# Patient Record
Sex: FEMALE | Race: WHITE | NOT HISPANIC OR LATINO
[De-identification: names, ages, dates, MRNs, and addresses within clinical notes are randomized per-mention and may not be internally consistent; named-entity substitution may affect disease eponyms.]

---

## 2017-01-11 ENCOUNTER — APPOINTMENT (OUTPATIENT)
Dept: ORTHOPEDIC SURGERY | Facility: CLINIC | Age: 52
End: 2017-01-11

## 2017-01-11 VITALS
DIASTOLIC BLOOD PRESSURE: 82 MMHG | HEART RATE: 73 BPM | WEIGHT: 272 LBS | BODY MASS INDEX: 42.69 KG/M2 | SYSTOLIC BLOOD PRESSURE: 126 MMHG | HEIGHT: 67 IN

## 2017-01-11 DIAGNOSIS — Z86.39 PERSONAL HISTORY OF OTHER ENDOCRINE, NUTRITIONAL AND METABOLIC DISEASE: ICD-10-CM

## 2017-01-11 DIAGNOSIS — M70.62 TROCHANTERIC BURSITIS, LEFT HIP: ICD-10-CM

## 2017-01-11 DIAGNOSIS — M54.5 LOW BACK PAIN: ICD-10-CM

## 2017-01-11 PROBLEM — Z00.00 ENCOUNTER FOR PREVENTIVE HEALTH EXAMINATION: Status: ACTIVE | Noted: 2017-01-11

## 2017-01-11 RX ORDER — OMEPRAZOLE 40 MG/1
CAPSULE, DELAYED RELEASE ORAL
Refills: 0 | Status: ACTIVE | COMMUNITY

## 2017-01-12 ENCOUNTER — TRANSCRIPTION ENCOUNTER (OUTPATIENT)
Age: 52
End: 2017-01-12

## 2019-08-21 ENCOUNTER — APPOINTMENT (OUTPATIENT)
Dept: ORTHOPEDIC SURGERY | Facility: CLINIC | Age: 54
End: 2019-08-21
Payer: COMMERCIAL

## 2019-08-21 ENCOUNTER — TRANSCRIPTION ENCOUNTER (OUTPATIENT)
Age: 54
End: 2019-08-21

## 2019-08-21 VITALS — HEART RATE: 67 BPM | DIASTOLIC BLOOD PRESSURE: 83 MMHG | SYSTOLIC BLOOD PRESSURE: 129 MMHG

## 2019-08-21 PROCEDURE — 72170 X-RAY EXAM OF PELVIS: CPT | Mod: 59

## 2019-08-21 PROCEDURE — 72110 X-RAY EXAM L-2 SPINE 4/>VWS: CPT

## 2019-08-21 PROCEDURE — 99213 OFFICE O/P EST LOW 20 MIN: CPT

## 2019-08-21 NOTE — PHYSICAL EXAM
[Obese] : obese [Limited] : is limited [Normal] : normal [L5-LT] : L5 [DP] : dorsalis pedis 2+ and symmetric bilaterally [PT] : posterior tibial 2+ and symmetric bilaterally [] : Motor: [NL] : Motor strength of the left lower extremity was normal [___/5] : right ([unfilled]/5) [Motor Strength Lower Extremities] : right (5/5) [2+] : left patella 2+ [1+] : left ankle jerk 1+ [Poor Appearance] : well-appearing [Acute Distress] : not in acute distress [Abl Mood] : in a normal mood [Abl Affect] : with normal affect [Poor Coordination] : normal coordination [Disorientation] : oriented x 3 [Painful] : not painful [SLR] : negative straight leg raise [Plantar Reflex Right Only] : absent on the right [Plantar Reflex Left Only] : absent on the left [DTR Reflexes Clonus Of Left Ankle (___ Beats)] : absent on the left [DTR Reflexes Clonus Of Right Ankle (___ Beats)] : absent on the right [de-identified] : She can forward flex to her knees and extend 30° with no back pain [FreeTextEntry2] : The pt is awake, alert and oriented to self, place and time, is comfortable and in no acute distress. Gait examination reveals a narrow based, non-ataxic, non-antalgic gait. Can heel and toe walk without difficulty. Inspection of neck, back and lower extremities bilaterally reveals no rashes or ecchymotic lesions.  There is no obvious abnormal spinal curvature in the sagittal and coronal planes. There is no tenderness over the cervical, thoracic or lumbar spine, or the paraspinal or upper and lower extremities musculature. There is no sacroiliac tenderness. No atrophy or abnormal movements noted in the upper or lower extremities. There is no swelling noted in the upper or lower extremities bilaterally. No cervical lymphadenopathy noted anteriorly. No joint laxity noted in the upper and lower extremity joints bilaterally.\par  Hip range of motion is degrees internal rotation 30° external rotation without pain. Full range of motion of the shoulders bilaterally with no significant pain\par Negative straight leg raise to 45° in the sitting position bilaterally. There is no groin pain with hip internal rotation and a negative HUNTER test bilaterally.  [de-identified] : Right GT tenderness [de-identified] : AP pelvis demonstrates normal appearance with no significant degenerative changes are noted. No acute fractures identified\par \par 4 views lumbar spine demonstrate her vascular clips and the right upper quadrant. Minimal left-sided lumbar curve is noted there on the lateral projection lordotic angulation is seen at the L4-5 level. Between flexion-extension no dynamic instability is noted. There is loss of disc height posteriorly with degenerative changes seen at the L4-5 level. No acute fractures identified.

## 2019-08-21 NOTE — DISCUSSION/SUMMARY
[Medication Risks Reviewed] : Medication risks reviewed [de-identified] : Recommended MRI of the lumbar spine for further evaluation. Prescribed her Voltaren gel and diclofenac.Further treatment options can be discussed after the MRI has beenperformed.\par \par The patient was educated regarding their condition, treatment options as well as prescribed course of treatment. \par Risks and benefits as well as alternatives to the proposed treatment were also provided to the patient \par They were given the opportunity to have all their questions answered to their satisfaction.\par \par Vital signs were reviewed with the patient and the patient was instructed to followup with their primary care provider for further management.\par \par Healthy lifestyle recommendations were also made including a tobacco free lifestyle, proper diet, and weight control.

## 2019-08-21 NOTE — HISTORY OF PRESENT ILLNESS
[6] : a maximum pain level of 6/10 [Constant] : ~He/She~ states the symptoms seem to be constant [Improving] : improving [___ days] : [unfilled] day(s) ago [de-identified] : Patient presents here today with lower back pain radiating from her lower back into the right hip. There is recently no known injuries. Patient reports when sitting the pain is not at it's worse, however when lying down that is when the pain occurs. \par Since last visit 1/2017 she has had occasional discomfort. [de-identified] : laying down [de-identified] : sitting [de-identified] : Advil, Aleve  which gives no relief

## 2019-09-07 ENCOUNTER — OUTPATIENT (OUTPATIENT)
Dept: OUTPATIENT SERVICES | Facility: HOSPITAL | Age: 54
LOS: 1 days | End: 2019-09-07
Payer: COMMERCIAL

## 2019-09-07 ENCOUNTER — APPOINTMENT (OUTPATIENT)
Dept: MRI IMAGING | Facility: IMAGING CENTER | Age: 54
End: 2019-09-07
Payer: COMMERCIAL

## 2019-09-07 DIAGNOSIS — M51.37 OTHER INTERVERTEBRAL DISC DEGENERATION, LUMBOSACRAL REGION: ICD-10-CM

## 2019-09-07 DIAGNOSIS — M54.16 RADICULOPATHY, LUMBAR REGION: ICD-10-CM

## 2019-09-07 PROCEDURE — 72148 MRI LUMBAR SPINE W/O DYE: CPT

## 2019-09-07 PROCEDURE — 72148 MRI LUMBAR SPINE W/O DYE: CPT | Mod: 26

## 2019-09-25 ENCOUNTER — APPOINTMENT (OUTPATIENT)
Dept: ORTHOPEDIC SURGERY | Facility: CLINIC | Age: 54
End: 2019-09-25
Payer: COMMERCIAL

## 2019-09-25 VITALS
HEART RATE: 70 BPM | BODY MASS INDEX: 42.53 KG/M2 | SYSTOLIC BLOOD PRESSURE: 153 MMHG | WEIGHT: 271 LBS | DIASTOLIC BLOOD PRESSURE: 80 MMHG | HEIGHT: 67 IN

## 2019-09-25 DIAGNOSIS — M54.16 RADICULOPATHY, LUMBAR REGION: ICD-10-CM

## 2019-09-25 PROCEDURE — 99214 OFFICE O/P EST MOD 30 MIN: CPT

## 2019-09-25 NOTE — HISTORY OF PRESENT ILLNESS
[Improving] : improving [None] : No exacerbating factors are noted [0] : a current pain level of 0/10 [Rest] : relieved by rest [de-identified] : Patient is here today to review mri lumbar spine 9/7/19. Patient states since her last office visit feeling less pain. Patient stopped gabapentin and diclofenac.\par Right leg pain has resolved.\par Has chronic axial low back pain.\par Gastric bypass surgery ongoing discussion

## 2019-09-25 NOTE — PHYSICAL EXAM
[Obese] : obese [Normal] : normal [Limited] : is limited [NL] : Motor strength of the left lower extremity was normal [___/5] : right ([unfilled]/5) [Motor Strength Lower Extremities] : right (5/5) [] : Sensory: [L5-LT] : L5 [2+] : left patella 2+ [1+] : left ankle jerk 1+ [DP] : dorsalis pedis 2+ and symmetric bilaterally [PT] : posterior tibial 2+ and symmetric bilaterally [Poor Appearance] : well-appearing [Acute Distress] : not in acute distress [Abl Mood] : in a normal mood [Abl Affect] : with normal affect [Poor Coordination] : normal coordination [Disorientation] : oriented x 3 [Painful] : not painful [SLR] : negative straight leg raise [Plantar Reflex Right Only] : absent on the right [Plantar Reflex Left Only] : absent on the left [DTR Reflexes Clonus Of Right Ankle (___ Beats)] : absent on the right [DTR Reflexes Clonus Of Left Ankle (___ Beats)] : absent on the left [FreeTextEntry2] : The pt is awake, alert and oriented to self, place and time, is comfortable and in no acute distress. Gait examination reveals a narrow based, non-ataxic, non-antalgic gait. Can heel and toe walk without difficulty. Inspection of neck, back and lower extremities bilaterally reveals no rashes or ecchymotic lesions.  There is no obvious abnormal spinal curvature in the sagittal and coronal planes. There is no tenderness over the cervical, thoracic or lumbar spine, or the paraspinal or upper and lower extremities musculature. There is no sacroiliac tenderness. No atrophy or abnormal movements noted in the upper or lower extremities. There is no swelling noted in the upper or lower extremities bilaterally. No cervical lymphadenopathy noted anteriorly. No joint laxity noted in the upper and lower extremity joints bilaterally.\par  Hip range of motion is degrees internal rotation 30° external rotation without pain. Full range of motion of the shoulders bilaterally with no significant pain\par Negative straight leg raise to 45° in the sitting position bilaterally. There is no groin pain with hip internal rotation and a negative HUNTER test bilaterally.  [de-identified] : She can forward flex to her knees and extend 30° with no back pain [de-identified] : Right GT tenderness [de-identified] : EXAM: MR SPINE LUMBAR \par \par PROCEDURE DATE: 09/07/2019 \par \par INTERPRETATION: CLINICAL INFORMATION: Lower back pain radiating into right \par hip and leg. Sciatica. \par \par TECHNIQUE: Multiplanar, multisequence MR imaging was obtained of the \par lumbosacral spine. \par \par Comparison: Lumbar spine radiographs dated 8/21/2019 from [facility]. \par \par FINDINGS: \par \par The study assumes 5 lumbar type nonrib-bearing vertebrae. \par \par DISTAL CORD AND CONUS: Conus terminates at the level of L2. Normal \par morphology of conus terminalis and cauda equina. \par BONES: The L4 vertebrae demonstrates mild decreased T1 signal throughout the \par vertebral body without marrow edema. The vertebra is mildly enlarged in the \par AP dimension relative to the adjacent vertebra. There is a small Schmorl's \par node at the superior endplate of L4. No focal lesion is identified. \par SPINAL ALIGNMENT: Minimal levocurvature with apex at L3. \par \par DISC LEVEL EVALUATION: Disc desiccation is noted throughout the \par intervertebral discs. Disc heights are maintained. \par \par T11/T12: Evaluated only in the sagittal plane. No central canal or foraminal \par narrowing. \par T12/L1: No central canal or neuroforaminal narrowing. \par L1/L2: No central canal or neuroforaminal narrowing. Lateral recesses are \par preserved. \par L2/L3: No central canal or neuroforaminal narrowing. Lateral recesses are \par preserved. \par L3/L4: Mild bilateral facet productive change. Small facet effusions. Mild \par disc bulging prominent in the foramina. No central canal or lateral recess \par narrowing. Mild bilateral foraminal narrowing. \par L4/L5: There is disc bulging and posterior osseous ridging with a \par superimposed left foraminal disc protrusion contributing to mild to moderate \par left foraminal narrowing. There is mild right foraminal narrowing. Mild left \par lateral recess narrowing. No central canal stenosis. \par L5/S1: Mild disc bulge with flattening of the ventral thecal sac. No \par neuroforaminal narrowing. Moderate bilateral facet arthrosis. \par \par SI JOINTS: The visualized portions are unremarkable. \par PARASPINAL MUSCLE AND SOFT TISSUES: Within normal limits. \par INTRAABDOMINAL/INTRAPELVIC SOFT TISSUES: Within normal limits. \par \par IMPRESSION: \par \par 1. At L4/L5, mild left foraminal disc herniation with mild neuroforaminal \par narrowing . \par 2. Additional multilevel spondylosis as above. \par 3. Mildly enlarged L4 vertebral body with hypointense T1 marrow signal \par throughout without focal destructive lesion. Findings may reflect early \par Paget's disease and three month interval follow-up is recommended to ensure \par a lack of change. \par \par KELLEN ROCHA M.D., RADIOLOGY RESIDENT \par This document has been electronically signed. \par DIANDRA GILLIAM M.D., ATTENDING RADIOLOGIST \par This document has been electronically signed. Sep 9 2019 1:07PM

## 2019-09-25 NOTE — REASON FOR VISIT
[Follow-Up Visit] : a follow-up visit for [Degenerative Joint Disease] : degenerative joint disease [Back Pain] : back pain [Radiculopathy] : radiculopathy

## 2020-02-11 ENCOUNTER — RESULT REVIEW (OUTPATIENT)
Age: 55
End: 2020-02-11

## 2020-03-12 ENCOUNTER — EMERGENCY (EMERGENCY)
Facility: HOSPITAL | Age: 55
LOS: 1 days | Discharge: HOME | End: 2020-03-12
Admitting: EMERGENCY MEDICINE
Payer: COMMERCIAL

## 2020-03-12 VITALS
TEMPERATURE: 98 F | DIASTOLIC BLOOD PRESSURE: 81 MMHG | SYSTOLIC BLOOD PRESSURE: 138 MMHG | OXYGEN SATURATION: 100 % | RESPIRATION RATE: 18 BRPM | HEART RATE: 68 BPM

## 2020-03-12 DIAGNOSIS — Z98.84 BARIATRIC SURGERY STATUS: Chronic | ICD-10-CM

## 2020-03-12 LAB

## 2020-03-12 PROCEDURE — 71046 X-RAY EXAM CHEST 2 VIEWS: CPT | Mod: 26

## 2020-03-12 PROCEDURE — 99283 EMERGENCY DEPT VISIT LOW MDM: CPT

## 2020-03-12 NOTE — ED PROVIDER NOTE - NSFOLLOWUPINSTRUCTIONS_ED_ALL_ED_FT
Follow up with your primary care provider within 1 week  Drink plenty of fluids  Take Tylenol 650mg every 6 hours as needed for fever or body aches  Take Tessalon Perle 100mg (1 tablet) every 8 hours as needed for cough  Return to the ER with any worsening or concerning symptoms, shortness of breath, worsening cough, high fever or any other concerns.

## 2020-03-12 NOTE — ED PROVIDER NOTE - PATIENT PORTAL LINK FT
You can access the FollowMyHealth Patient Portal offered by [facility] by registering at the following website: [email or web address]. By joining [employer]’s FollowMyHealth portal, you will also be able to view your health information using other applications (apps) compatible with our system.

## 2020-03-12 NOTE — ED PROVIDER NOTE - OBJECTIVE STATEMENT
54yF w/pmhx gastric bypass 4 months ago presenting with cough, fever and body aches x 2 days. Pt states 2 nights ago she developed lightheadedness with cough. The following day she felt body aches, weakness, subjective fever. She went to  yesterday and negative for the flu, she was told she had a viral illness and sent home. Pt denies dizziness, chest pain, shortness of breath, abdominal pain, nausea, vomiting, diarrhea, sick contacts, recent travel, known contact with COVID or any other concerns.

## 2020-03-12 NOTE — ED ADULT TRIAGE NOTE - CHIEF COMPLAINT QUOTE
states" I am having fever and body aches with cough since yesterday " denies any past medical history

## 2020-03-12 NOTE — ED PROVIDER NOTE - PROGRESS NOTE DETAILS
PETR Gillis: CXR clear, admin PA to follow RVP. Discussed with pt she should isolate herself as much as possible and wear mask. Recommend fluids and Tylenol PRN. Will rx tessalon perles for cough

## 2020-03-12 NOTE — ED PROVIDER NOTE - CLINICAL SUMMARY MEDICAL DECISION MAKING FREE TEXT BOX
54yF w/pmhx gastric bypass 4 months ago presenting with cough, fever and body aches x 2 days. Pt is well appearing. She was flu negative yesterday at , told by her job to come to ER to be evaluated. Pt is low risk for COVID, no recent travel, no sick contacts, no contacts with confirmed COVID. Will get CXR to r/o pneumonia and RVP. Pt does not meet criteria for testing COVID at this time. Pt refusing pain medication for body aches.

## 2020-10-07 PROBLEM — Z78.9 OTHER SPECIFIED HEALTH STATUS: Chronic | Status: ACTIVE | Noted: 2020-03-12

## 2020-10-14 ENCOUNTER — APPOINTMENT (OUTPATIENT)
Dept: ORTHOPEDIC SURGERY | Facility: CLINIC | Age: 55
End: 2020-10-14
Payer: COMMERCIAL

## 2020-10-14 VITALS
DIASTOLIC BLOOD PRESSURE: 80 MMHG | HEIGHT: 67 IN | HEART RATE: 58 BPM | TEMPERATURE: 97.3 F | SYSTOLIC BLOOD PRESSURE: 120 MMHG

## 2020-10-14 DIAGNOSIS — M75.81 OTHER SHOULDER LESIONS, RIGHT SHOULDER: ICD-10-CM

## 2020-10-14 PROCEDURE — 99214 OFFICE O/P EST MOD 30 MIN: CPT | Mod: 25

## 2020-10-14 PROCEDURE — 73030 X-RAY EXAM OF SHOULDER: CPT | Mod: RT

## 2020-10-14 PROCEDURE — 20610 DRAIN/INJ JOINT/BURSA W/O US: CPT | Mod: RT

## 2020-10-14 RX ADMIN — Medication %: at 00:00

## 2020-10-14 RX ADMIN — METHYLPREDNISOLONE ACETATE MG/ML: 40 INJECTION, SUSPENSION INTRA-ARTICULAR; INTRALESIONAL; INTRAMUSCULAR; SOFT TISSUE at 00:00

## 2020-10-14 RX ADMIN — BUPIVACAINE HYDROCHLORIDE %: 2.5 INJECTION, SOLUTION INFILTRATION; PERINEURAL at 00:00

## 2020-10-14 NOTE — DISCUSSION/SUMMARY
[Medication Risks Reviewed] : Medication risks reviewed [de-identified] : The patient has symptoms consistent with rotator cuff tendinitis right shoulder with impingement syndrome.  Offered her injection of corticosteroids for this and she wanted to proceed.  Under sterile conditions 40 mg Depo-Medrol mixed with a 5 cc solution of 1% lidocaine 1.25% Marcaine without epinephrine was injected into the right shoulder subacromial space.  The patient tolerated the procedure well and had more than 80% relief of pain symptoms 5 minutes after the injection.  Prescribed her physical therapy.  Recommended follow-up in 4 to 6 weeks on as-needed basis.  If her symptoms persist or worsen additional imaging studies may be considered at that time including MRI of the right shoulder.\par \par Patient was noted to have an L4 lesion an MRI obtained last year.  Recommended at this time an updated MRI which had been previously recommended a 3-month follow-up with the patient chose not to pursue it at that time.  We will do a follow-up MRI as previously indicated for the lumbar spine.\par \par The patient was educated regarding their condition, treatment options as well as prescribed course of treatment. \par Risks and benefits as well as alternatives to the proposed treatment were also provided to the patient \par They were given the opportunity to have all their questions answered to their satisfaction.\par \par Vital signs were reviewed with the patient and the patient was instructed to followup with their primary care provider for further management.\par \par Healthy lifestyle recommendations were also made including a tobacco free lifestyle, proper diet, and weight control.

## 2020-10-14 NOTE — PHYSICAL EXAM
[Normal] : Gait: normal [UE] : Sensory: Intact in bilateral upper extremities [Bicep] : biceps 2+ and symmetric bilaterally [B.R.] : biceps 2+ and symmetric bilaterally [Tricep] : triceps 2+ and symmetric bilaterally [Rad] : radial 2+ and symmetric bilaterally [de-identified] : The pt is awake, alert and oriented to self, place and time, is comfortable and in no acute distress. Gait evaluation reveals a narrow based, non-ataxic, non-antalgic gait. Negative Romberg sign noted. Can heel and toe walk without difficulty. Inspection of the neck, back and upper extremities bilaterally reveals no rashes or ecchymotic lesions. There is no obvious abnormal spinal curvature in the sagittal and coronal planes. No crepitus or instability noted with range of motion of bilateral upper extremities. No joint laxity noted in the upper and lower extremities bilaterally. No atrophy or abnormal movements noted in the upper or lower extremities. No tenderness over the cervical, thoracic, lumbar spine or in the paraspinal, or upper and lower extremity musculature. There is no swelling noted in the upper or lower extremities bilaterally. No cervical lymphadenopathy noted anteriorly.\par Negative Spurling's sign bilaterally. In the lumbar spine the patient can forward flex to her mid tibia and extend 30 degrees without pain\par Negative Babinski sign and no clonus bilaterally in the upper or lower extremities. [de-identified] : AP and lateral image of the right shoulder demonstrates minimal AC arthrosis with small anterior osteophyte suspected.  No significant degeneration of the glenohumeral joint obvious luxation appreciated. [de-identified] : full ROM cervical spine flex chin to chest, ext 30 degrees, left and right lat rot 30 degrees no pain\par + Neers sign right shoulder,  degrees, ER 40 degrees IR to L2 compared to 130, 50 and L1 on the left\par Tenderness over the greater tuberosity.

## 2020-10-14 NOTE — HISTORY OF PRESENT ILLNESS
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

## 2020-10-26 RX ORDER — BUPIVACAINE HCL/PF 2.5 MG/ML
0.25 VIAL (ML) INJECTION
Refills: 0 | Status: COMPLETED | OUTPATIENT
Start: 2020-10-14

## 2020-10-26 RX ORDER — METHYLPRED ACET/NACL,ISO-OS/PF 40 MG/ML
40 VIAL (ML) INJECTION
Qty: 1 | Refills: 0 | Status: COMPLETED | OUTPATIENT
Start: 2020-10-14

## 2020-10-26 RX ORDER — LIDOCAINE HYDROCHLORIDE 10 MG/ML
1 INJECTION, SOLUTION INFILTRATION; PERINEURAL
Refills: 0 | Status: COMPLETED | OUTPATIENT
Start: 2020-10-14

## 2020-11-03 ENCOUNTER — APPOINTMENT (OUTPATIENT)
Dept: MRI IMAGING | Facility: IMAGING CENTER | Age: 55
End: 2020-11-03
Payer: COMMERCIAL

## 2020-11-03 ENCOUNTER — OUTPATIENT (OUTPATIENT)
Dept: OUTPATIENT SERVICES | Facility: HOSPITAL | Age: 55
LOS: 1 days | End: 2020-11-03
Payer: COMMERCIAL

## 2020-11-03 DIAGNOSIS — M54.5 LOW BACK PAIN: ICD-10-CM

## 2020-11-03 DIAGNOSIS — Z98.84 BARIATRIC SURGERY STATUS: Chronic | ICD-10-CM

## 2020-11-03 DIAGNOSIS — Z00.8 ENCOUNTER FOR OTHER GENERAL EXAMINATION: ICD-10-CM

## 2020-11-03 PROCEDURE — 72148 MRI LUMBAR SPINE W/O DYE: CPT

## 2020-11-03 PROCEDURE — 72148 MRI LUMBAR SPINE W/O DYE: CPT | Mod: 26

## 2020-11-16 ENCOUNTER — APPOINTMENT (OUTPATIENT)
Dept: ORTHOPEDIC SURGERY | Facility: CLINIC | Age: 55
End: 2020-11-16
Payer: COMMERCIAL

## 2020-11-16 VITALS
TEMPERATURE: 97.4 F | DIASTOLIC BLOOD PRESSURE: 79 MMHG | HEART RATE: 57 BPM | SYSTOLIC BLOOD PRESSURE: 122 MMHG | HEIGHT: 67 IN

## 2020-11-16 DIAGNOSIS — M51.46 SCHMORL'S NODES, LUMBAR REGION: ICD-10-CM

## 2020-11-16 PROCEDURE — 99072 ADDL SUPL MATRL&STAF TM PHE: CPT

## 2020-11-16 PROCEDURE — 99214 OFFICE O/P EST MOD 30 MIN: CPT

## 2020-11-16 RX ORDER — LEVOTHYROXINE SODIUM 0.15 MG/1
150 TABLET ORAL
Qty: 90 | Refills: 0 | Status: ACTIVE | COMMUNITY
Start: 2020-09-22

## 2020-11-16 NOTE — HISTORY OF PRESENT ILLNESS
[Improving] : improving [1] : a current pain level of 1/10 [Daily] : ~He/She~ states the symptoms seem to be occuring daily [Bending] : worsened by bending [Lifting] : worsened by lifting [Physical Therapy] : relieved by physical therapy [Rest] : relieved by rest [de-identified] : Patient is here today to review mri lumbar spine 11/3/2020.  Patient is going for physical therapy for her shoulder. \par Riught shoulder pain is significantly improved with injection and PT [de-identified] : sitting

## 2020-11-16 NOTE — REASON FOR VISIT
[Follow-Up Visit] : a follow-up visit for [Back Pain] : back pain [FreeTextEntry2] : right rotator cuff tendinitis

## 2020-11-16 NOTE — PHYSICAL EXAM
[Normal] : Gait: normal [UE] : Sensory: Intact in bilateral upper extremities [Bicep] : biceps 2+ and symmetric bilaterally [B.R.] : biceps 2+ and symmetric bilaterally [Tricep] : triceps 2+ and symmetric bilaterally [Rad] : radial 2+ and symmetric bilaterally [de-identified] : EXAM: MR SPINE LUMBAR\par \par PROCEDURE DATE: 11/03/2020\par \par INTERPRETATION: LUMBOSACRAL SPINE MRI\par \par CLINICAL INFORMATION: Low back pain. Lesion at L4.\par TECHNIQUE: Multiplanar, multisequence MR imaging was obtained of the lumbosacral spine.\par FINDINGS:\par \par DISC LEVEL EVALUATION:\par \par L1/L2: Normal\par L2/L3: There is mild disc bulging without central canal or foraminal narrowing.\par L3/L4: There is mild disc bulging present with mild bilateral foraminal narrowing.\par L4/L5: Mild facet arthrosis is present. There is a small broad-based posterior disc protrusion with small posterior osteophyte formation causing moderate bilateral foraminal narrowing. Disc mildly indents the ventral thecal sac without significant stenosis. At the inferior endplate of L4, there is Schmorl's node formation with surrounding bone marrow edema. In addition, there is a region of surrounding low T1 signal and bright signal on T2-weighted imaging with a somewhat columnar pattern suggesting the possibility of an underlying hemangioma.\par L5/S1: There is a very small posterior disc protrusion with mild to moderate bilateral foraminal narrowing.\par \par SPINAL ALIGNMENT: Trace anterolisthesis at L4-L5.\par DISTAL CORD AND CONUS: The distal cord is normal in appearance. The conus terminates at L1 and is unremarkable.\par SI JOINTS: There is minimal degenerative change of the right SI joint inferiorly\par MARROW: There is a hemangioma within the T12 vertebral body. As described above there is Schmorl's node formation with surrounding bone marrow edema and a suspected adjacent hemangioma within the inferior aspect of the L4 vertebral body.\par PARASPINAL MUSCLE AND SOFT TISSUES: Normal\par INTRAABDOMINAL/INTRAPELVIC SOFT TISSUES: Normal\par \par IMPRESSION: Mild multilevel lumbar spondylosis that is most advanced at L4-L5 as detailed above.\par \par Within the L4 vertebral body, there is Schmorl's node formation at the inferior endplate with associated bone marrow edema and with a suspected adjacent hemangioma.\par \par YOSI FUENTES MD; Attending Radiologist\par This document has been electronically signed. Nov 4 2020 2:58PM [de-identified] : The pt is awake, alert and oriented to self, place and time, is comfortable and in no acute distress. Gait evaluation reveals a narrow based, non-ataxic, non-antalgic gait. Negative Romberg sign noted. Can heel and toe walk without difficulty. Inspection of the neck, back and upper extremities bilaterally reveals no rashes or ecchymotic lesions. There is no obvious abnormal spinal curvature in the sagittal and coronal planes. No crepitus or instability noted with range of motion of bilateral upper extremities. No joint laxity noted in the upper and lower extremities bilaterally. No atrophy or abnormal movements noted in the upper or lower extremities. No tenderness over the cervical, thoracic, lumbar spine or in the paraspinal, or upper and lower extremity musculature. There is no swelling noted in the upper or lower extremities bilaterally. No cervical lymphadenopathy noted anteriorly.\par Negative Spurling's sign bilaterally. In the lumbar spine the patient can forward flex to her mid tibia and extend 30 degrees without pain\par Negative Babinski sign and no clonus bilaterally in the upper or lower extremities. [de-identified] : full ROM cervical spine flex chin to chest, ext 30 degrees, left and right lat rot 30 degrees no pain\par + Neers sign right shoulder,  degrees, ER 40 degrees IR to L2 compared to 130, 50 and L1 on the left\par Tenderness over the greater tuberosity.

## 2020-11-16 NOTE — DISCUSSION/SUMMARY
[Medication Risks Reviewed] : Medication risks reviewed [de-identified] : At this time the patient has reported significant improvement of right shoulder pain following her shoulder injection as well as physical therapy.  She is not interested in any surgical interventions for her shoulder.  She will continue exercises and physical therapy.  She has been approved for 4 visits but may benefit from additional therapy exercise for her shoulder.\par \par The patient has been doing dead lifts and significant weight training and I recommended she modify that given the MRI findings of a Schmorl's node and reactive changes of the L4 vertebral body.  The previously noted changes from MRI in October 2019 are essentially unchanged suggesting no obvious significant pathology at that level besides the Schmorl's node in the reactive endplate and vertebral body changes noted.  We discussed the option of cement augmentation for this but that would be considered off label intervention and she understands it.  The patient is also not interested in any surgical interventions and I highly recommended activity modification program for her.\par \par I will continue see her back on an as-needed basis for her symptoms.\par \par The patient was educated regarding their condition, treatment options as well as prescribed course of treatment. \par Risks and benefits as well as alternatives to the proposed treatment were also provided to the patient \par They were given the opportunity to have all their questions answered to their satisfaction.\par \par Vital signs were reviewed with the patient and the patient was instructed to followup with their primary care provider for further management.\par \par Healthy lifestyle recommendations were also made including a tobacco free lifestyle, proper diet, and weight control.

## 2021-02-15 ENCOUNTER — RESULT REVIEW (OUTPATIENT)
Age: 56
End: 2021-02-15

## 2022-01-31 ENCOUNTER — OUTPATIENT (OUTPATIENT)
Dept: OUTPATIENT SERVICES | Facility: HOSPITAL | Age: 57
LOS: 1 days | End: 2022-01-31
Payer: COMMERCIAL

## 2022-01-31 ENCOUNTER — APPOINTMENT (OUTPATIENT)
Dept: MRI IMAGING | Facility: IMAGING CENTER | Age: 57
End: 2022-01-31
Payer: COMMERCIAL

## 2022-01-31 DIAGNOSIS — Z00.8 ENCOUNTER FOR OTHER GENERAL EXAMINATION: ICD-10-CM

## 2022-01-31 DIAGNOSIS — Z98.84 BARIATRIC SURGERY STATUS: Chronic | ICD-10-CM

## 2022-01-31 PROCEDURE — 70553 MRI BRAIN STEM W/O & W/DYE: CPT | Mod: 26

## 2022-01-31 PROCEDURE — 70544 MR ANGIOGRAPHY HEAD W/O DYE: CPT | Mod: 26,59

## 2022-01-31 PROCEDURE — 70544 MR ANGIOGRAPHY HEAD W/O DYE: CPT

## 2022-01-31 PROCEDURE — 70553 MRI BRAIN STEM W/O & W/DYE: CPT

## 2022-01-31 PROCEDURE — A9585: CPT

## 2022-02-21 ENCOUNTER — RESULT REVIEW (OUTPATIENT)
Age: 57
End: 2022-02-21

## 2022-05-24 ENCOUNTER — NON-APPOINTMENT (OUTPATIENT)
Age: 57
End: 2022-05-24

## 2022-06-06 ENCOUNTER — APPOINTMENT (OUTPATIENT)
Dept: ORTHOPEDIC SURGERY | Facility: CLINIC | Age: 57
End: 2022-06-06
Payer: COMMERCIAL

## 2022-06-06 VITALS
DIASTOLIC BLOOD PRESSURE: 70 MMHG | BODY MASS INDEX: 24.11 KG/M2 | WEIGHT: 150 LBS | HEART RATE: 69 BPM | HEIGHT: 66 IN | SYSTOLIC BLOOD PRESSURE: 107 MMHG

## 2022-06-06 DIAGNOSIS — M51.37 OTHER INTERVERTEBRAL DISC DEGENERATION, LUMBOSACRAL REGION: ICD-10-CM

## 2022-06-06 DIAGNOSIS — M54.16 RADICULOPATHY, LUMBAR REGION: ICD-10-CM

## 2022-06-06 DIAGNOSIS — M51.26 OTHER INTERVERTEBRAL DISC DISPLACEMENT, LUMBAR REGION: ICD-10-CM

## 2022-06-06 PROCEDURE — 99214 OFFICE O/P EST MOD 30 MIN: CPT

## 2022-06-06 PROCEDURE — 72170 X-RAY EXAM OF PELVIS: CPT

## 2022-06-06 PROCEDURE — 72110 X-RAY EXAM L-2 SPINE 4/>VWS: CPT

## 2022-06-06 RX ORDER — DICLOFENAC SODIUM 50 MG/1
50 TABLET, DELAYED RELEASE ORAL
Qty: 30 | Refills: 2 | Status: DISCONTINUED | COMMUNITY
Start: 2019-08-21 | End: 2022-06-06

## 2022-06-06 RX ORDER — DICLOFENAC SODIUM 10 MG/G
1 GEL TOPICAL TWICE DAILY
Qty: 1 | Refills: 2 | Status: DISCONTINUED | COMMUNITY
Start: 2017-01-11 | End: 2022-06-06

## 2022-06-06 RX ORDER — LEVOTHYROXINE SODIUM 137 UG/1
TABLET ORAL
Refills: 0 | Status: DISCONTINUED | COMMUNITY
End: 2022-06-06

## 2022-06-06 RX ORDER — GABAPENTIN 300 MG/1
300 CAPSULE ORAL
Qty: 30 | Refills: 0 | Status: DISCONTINUED | COMMUNITY
Start: 2019-08-21 | End: 2022-06-06

## 2022-06-06 RX ORDER — LEVOTHYROXINE SODIUM 0.17 MG/1
175 TABLET ORAL
Qty: 90 | Refills: 0 | Status: DISCONTINUED | COMMUNITY
Start: 2020-03-10 | End: 2022-06-06

## 2022-06-06 RX ORDER — TINIDAZOLE 500 MG/1
500 TABLET, FILM COATED ORAL
Qty: 8 | Refills: 0 | Status: ACTIVE | COMMUNITY
Start: 2022-02-22

## 2022-06-06 RX ORDER — CRISABOROLE 20 MG/G
2 OINTMENT TOPICAL
Qty: 60 | Refills: 0 | Status: DISCONTINUED | COMMUNITY
Start: 2020-07-06 | End: 2022-06-06

## 2022-06-06 RX ORDER — TRIAMCINOLONE ACETONIDE 1 MG/G
0.1 OINTMENT TOPICAL
Qty: 80 | Refills: 0 | Status: DISCONTINUED | COMMUNITY
Start: 2020-06-01 | End: 2022-06-06

## 2022-06-06 RX ORDER — CLOBETASOL PROPIONATE 0.5 MG/ML
0.05 SOLUTION TOPICAL
Qty: 50 | Refills: 0 | Status: DISCONTINUED | COMMUNITY
Start: 2020-06-01 | End: 2022-06-06

## 2022-06-06 NOTE — PHYSICAL EXAM
[Normal] : Gait: normal [___/5] : left ([unfilled]/5) [] : Sensory: [L4-LT] : L4 [Bicep] : biceps 2+ and symmetric bilaterally [B.R.] : biceps 2+ and symmetric bilaterally [Tricep] : triceps 2+ and symmetric bilaterally [2+] : right patella 2+ [0] : left patella 0 [1+] : left ankle jerk 1+ [DP] : dorsalis pedis 2+ and symmetric bilaterally [Rad] : radial 2+ and symmetric bilaterally [de-identified] : X-rays show mild degenerative changes at L4-5 with no significant interval change of spondylolisthesis at L4-5 when compared with x-rays from 2019.  Slight progression of degeneration on the concavity at L4-5 on the right when compared with x-rays previously with slight worsening of left-sided lumbar curve.  Grade 1 spinal listhesis L4-5.\par \par AP pelvis demonstrates normal appearance of the hips bilaterally.  No acute fractures.  No significant degeneration. [Plantar Reflex Right Only] : absent on the right [Plantar Reflex Left Only] : absent on the left [DTR Reflexes Clonus Of Right Ankle (___ Beats)] : absent on the right [DTR Reflexes Clonus Of Left Ankle (___ Beats)] : absent on the left [de-identified] : The pt is awake, alert and oriented to self, place and time, is comfortable and in no acute distress. Gait evaluation reveals a narrow based, non-ataxic, non-antalgic gait. Negative Romberg sign noted. Can heel and toe walk without difficulty. Inspection of the neck, back and upper extremities bilaterally reveals no rashes or ecchymotic lesions. There is no obvious abnormal spinal curvature in the sagittal and coronal planes. No crepitus or instability noted with range of motion of bilateral upper extremities. No joint laxity noted in the upper and lower extremities bilaterally. No atrophy or abnormal movements noted in the upper or lower extremities. No tenderness over the cervical, thoracic, lumbar spine or in the paraspinal, or upper and lower extremity musculature. There is no swelling noted in the upper or lower extremities bilaterally. No cervical lymphadenopathy noted anteriorly.\par Negative Spurling's sign bilaterally. In the lumbar spine the patient can forward flex to her mid tibia and extend 30 degrees without pain\par Negative Babinski sign and no clonus bilaterally in the upper or lower extremities. [de-identified] : full ROM cervical spine flex chin to chest, ext 30 degrees, left and right lat rot 30 degrees no pain\par + Neers sign right shoulder,  degrees, ER 40 degrees IR to L2 compared to 130, 50 and L1 on the left\par Tenderness over the greater tuberosity.\par In the lumbar spine she can forward flex to her mid tibia and extend 30 degrees.

## 2022-06-06 NOTE — DISCUSSION/SUMMARY
[Medication Risks Reviewed] : Medication risks reviewed [de-identified] : Patient presents with symptoms of low back pain radiating down her left leg with numbness which is chronic.  Trial of gabapentin was prescribed for her along with diclofenac.X-rays show mild degenerative changes at L4-5 with no significant interval change of spondylolisthesis at L4-5 when compared with x-rays from 2019.  Recommended follow-up in 2 to 4 weeks.  If her symptoms persist or worsen MRI lumbar spine may be considered to reassess the previously noted reactive change and Schmorl's node at L4 vertebral body.

## 2022-06-06 NOTE — HISTORY OF PRESENT ILLNESS
[Daily] : ~He/She~ states the symptoms seem to be occuring daily [Improving] : improving [___ wks] : [unfilled] week(s) ago [2] : a current pain level of 2/10 [de-identified] : Patient is here today for evaluation on her low back left leg into left knee pain going on since 5/21/22 no injury and not medically treated for this issue. Patient states no real pain mainly numbness down her left buttock and lateral thigh to the knee. Now 90% better. Took some NSAIDS\par Hx of gastric bypass in the past, 140 pounds  cant take NSAIDs - had an ulcer with recent nsaid use.\par Chronic numbness left thigh [de-identified] : lying down [de-identified] : sitting forward

## 2022-09-01 ENCOUNTER — NON-APPOINTMENT (OUTPATIENT)
Age: 57
End: 2022-09-01

## 2022-09-09 ENCOUNTER — NON-APPOINTMENT (OUTPATIENT)
Age: 57
End: 2022-09-09

## 2022-09-09 ENCOUNTER — APPOINTMENT (OUTPATIENT)
Dept: ORTHOPEDIC SURGERY | Facility: CLINIC | Age: 57
End: 2022-09-09

## 2022-09-09 VITALS
HEIGHT: 55 IN | BODY MASS INDEX: 35.64 KG/M2 | DIASTOLIC BLOOD PRESSURE: 81 MMHG | SYSTOLIC BLOOD PRESSURE: 129 MMHG | WEIGHT: 154 LBS | HEART RATE: 56 BPM

## 2022-09-09 DIAGNOSIS — M79.641 PAIN IN RIGHT HAND: ICD-10-CM

## 2022-09-09 DIAGNOSIS — S50.02XA CONTUSION OF LEFT ELBOW, INITIAL ENCOUNTER: ICD-10-CM

## 2022-09-09 PROCEDURE — 99213 OFFICE O/P EST LOW 20 MIN: CPT | Mod: 25

## 2022-09-09 PROCEDURE — 20606 DRAIN/INJ JOINT/BURSA W/US: CPT | Mod: RT

## 2022-09-09 PROCEDURE — 73110 X-RAY EXAM OF WRIST: CPT | Mod: RT

## 2022-09-09 PROCEDURE — 73130 X-RAY EXAM OF HAND: CPT | Mod: RT

## 2022-09-09 RX ORDER — METHYLPRED ACET/NACL,ISO-OS/PF 40 MG/ML
40 VIAL (ML) INJECTION
Qty: 1 | Refills: 0 | Status: COMPLETED | OUTPATIENT
Start: 2022-09-09

## 2022-09-09 RX ORDER — LIDOCAINE HYDROCHLORIDE 10 MG/ML
1 INJECTION, SOLUTION INFILTRATION; PERINEURAL
Refills: 0 | Status: COMPLETED | OUTPATIENT
Start: 2022-09-09

## 2022-09-09 RX ADMIN — Medication %: at 00:00

## 2022-09-09 RX ADMIN — METHYLPREDNISOLONE ACETATE MG/ML: 40 INJECTION, SUSPENSION INTRA-ARTICULAR; INTRALESIONAL; INTRAMUSCULAR; SOFT TISSUE at 00:00

## 2022-09-09 NOTE — END OF VISIT
[FreeTextEntry3] : This note was written by Kassandra Stanley on 09/09/2022 acting solely as a scribe for Dr. Tommy Eisenberg.\par  \par All medical record entries made by the Scribe were at my, Dr. Tommy Eisenberg, direction and personally dictated by me on 09/09/2022. I have personally reviewed the chart and agree that the record accurately reflects my personal performance of the history, physical exam, assessment and plan.

## 2022-09-09 NOTE — ADDENDUM
[FreeTextEntry1] : I, Kassandra Stanley, acted solely as a scribe for Dr. Eisenberg on this date on 09/09/2022.

## 2022-09-09 NOTE — HISTORY OF PRESENT ILLNESS
[Right] : right hand dominant [FreeTextEntry1] : She comes in today for evaluation of right hand pain as well as numbness and tingling which began months ago. She is not able to make a full composite fist at times. She complains of numbness which wakes her up at night and she has to subsequently wring out her hands. She denies locking or triggering of the digits. \par She has a second complaint of left elbow pain secondary to an injury sustained two weeks ago. At that time she reports to have tripped over luggage and fell on her left side. She reports swelling to the elbow and sensitivity. She is not able to lean on her elbow due to her pain.\par \par She has a medical history which includes gastric bypass and is therefore unable to take NSAIDs. \par \par She is an established patient of Dr. Mango Cervantes.

## 2022-09-09 NOTE — DISCUSSION/SUMMARY
[FreeTextEntry1] : She has findings consistent with right thumb pain secondary to basal joint arthritis.  She also fell 2 weeks ago and has a left elbow hematoma.\par \par I had a discussion with the patient regarding today's visit, the prognosis of this diagnosis, and treatment recommendations and options. With regard to the right hand, we discussed treatment options of splinting and or therapy or a cortisone injection.  She cannot take anti-inflammatories because she is status post gastric bypass surgery. She agreed to proceed with a cortisone injection at the right thumb CMC joint and deferred a referral to hand therapy. She was fitted with a right thumb cool comfort thumb spica splint. \par \par With regard to the left elbow, I recommended avoiding leaning on the elbow, ice and a compressive wrap as needed. She will follow up, according to her symptoms in this regard. \par \par She has agreed to the above plan of management and has expressed full understanding.  All questions were fully answered to their satisfaction. \par \par My cumulative time spent on this visit included: Preparation for the visit, review of the medical records, review of pertinent diagnostic studies, examination and counseling of the patient on the above diagnosis, treatment plan and prognosis, orders of diagnostic tests, medication and/or appropriate procedures and documentation in the medical records of today's visit.

## 2022-09-09 NOTE — PROCEDURE
[FreeTextEntry1] : - After a discussion of risks and benefits, the patient agreed to proceed with a cortisone injection.  \par -  Side Injected: Right thumb carpometacarpal joint.\par -  Medications injected: 0.5cc of 1% Lidocaine and 1cc of 40mg of Depomedrol, using sterile technique.\par -  Ultrasound Guidance: Ultrasound guidance was used, because of anatomical considerations and deformity, to confirm correct localization of the needle within the carpometacarpal joint, prior to the injection. \par -  Patient tolerated the procedure well, without complications.\par -  Patient noted immediate relief of the symptoms, secondary to the anesthetic effects of the injection.\par -  Patient was told that the pain may worsen for a day or two, and should then begin to improve.\par -  Instructions: The patient was instructed on the use of ice, anti-inflammatory agents, or Tylenol, and activity modification.\par -  Follow-up: Within 4 weeks to assess the response to the injection.

## 2022-09-09 NOTE — PHYSICAL EXAM
[de-identified] : - Constitutional: This is a female in no obvious distress.  \par - Psych: Patient is alert and oriented to person, place and time.  Patient has a normal mood and affect.\par - Cardiovascular: Normal pulses throughout the upper extremities.  No significant varicosities are noted in the upper extremities. \par - Neuro: Strength and sensation are intact throughout the upper extremities.  Patient has normal coordination.\par - Respiratory:  Patient exhibits no evidence of shortness of breath or difficulty breathing.\par - Skin: No rashes, lesions, or other abnormalities are noted in the upper extremities.\par \par --- \par \par Side: Right Thumb\par -  There is swelling and tenderness along the basal joint of the thumb.  \par -  There is a positive grind test.  \par -  There is no instability of the basal joint of the thumb.  \par -  There is no evidence of a trigger thumb.  \par -  There is no evidence of DeQuervain's tendonitis.  \par -  Provocative signs for carpal tunnel syndrome are negative.  \par -  There is normal strength and sensation distally along the radial, ulnar and median nerve distributions.  \par -  There is full composite range-of-motion of the other digits into the palm.  	   [de-identified] : PA, lateral, and oblique radiographs of the right wrist and hand demonstrate moderate basal joint arthritis of the thumb.

## 2023-07-26 ENCOUNTER — APPOINTMENT (OUTPATIENT)
Dept: ORTHOPEDIC SURGERY | Facility: CLINIC | Age: 58
End: 2023-07-26
Payer: COMMERCIAL

## 2023-07-26 DIAGNOSIS — S63.91XA SPRAIN OF UNSPECIFIED PART OF RIGHT WRIST AND HAND, INITIAL ENCOUNTER: ICD-10-CM

## 2023-07-26 DIAGNOSIS — M25.531 PAIN IN RIGHT WRIST: ICD-10-CM

## 2023-07-26 DIAGNOSIS — M18.11 UNILATERAL PRIMARY OSTEOARTHRITIS OF FIRST CARPOMETACARPAL JOINT, RIGHT HAND: ICD-10-CM

## 2023-07-26 PROCEDURE — 73130 X-RAY EXAM OF HAND: CPT | Mod: RT

## 2023-07-26 PROCEDURE — 73110 X-RAY EXAM OF WRIST: CPT | Mod: RT

## 2023-07-26 PROCEDURE — 99214 OFFICE O/P EST MOD 30 MIN: CPT | Mod: 25

## 2023-07-26 PROCEDURE — 20606 DRAIN/INJ JOINT/BURSA W/US: CPT | Mod: RT

## 2023-07-26 NOTE — PHYSICAL EXAM
[de-identified] : - Constitutional: This is a female in no obvious distress.  \par - Psych: Patient is alert and oriented to person, place and time.  Patient has a normal mood and affect.\par - Cardiovascular: Normal pulses throughout the upper extremities.  No significant varicosities are noted in the upper extremities. \par - Neuro: Strength and sensation are intact throughout the upper extremities.  Patient has normal coordination.\par - Respiratory:  Patient exhibits no evidence of shortness of breath or difficulty breathing.\par - Skin: No rashes, lesions, or other abnormalities are noted in the upper extremities.\par \par --- \par \par Examination of her right hand demonstrates swelling and tenderness along the CMC joint of the thumb.  There is associated crepitus.  There is no swelling or tenderness along the distal radius dorsally or snuffbox.  She has a palpable prominence along the dorsal base of the index finger proximal phalanx which appears consistent with an organized hematoma.  She has full flexion and extension of the index through little fingers.  She is neurovascularly intact distally. [de-identified] : PA, lateral, and oblique radiographs of the right wrist and hand a lateral radiograph of the right index finger demonstrate no fractures or dislocations and moderate CMC joint arthritis of the thumb.

## 2023-07-26 NOTE — PROCEDURE
[FreeTextEntry1] : - After a discussion of risks and benefits, the patient agreed to proceed with a cortisone injection.  \par -  Side Injected: RIght thumb carpometacarpal joint.\par -  Medications injected: 0.5cc of 1% Lidocaine and 1cc of 40mg of Depomedrol, using sterile technique.\par -  Ultrasound Guidance: Ultrasound guidance was used, because of anatomical considerations and deformity, to confirm correct localization of the needle within the carpometacarpal joint, prior to the injection. \par -  Patient tolerated the procedure well, without complications.\par -  Patient noted immediate relief of the symptoms, secondary to the anesthetic effects of the injection.\par -  Patient was told that the pain may worsen for a day or two, and should then begin to improve.\par -  Instructions: The patient was instructed on the use of ice, anti-inflammatory agents, or Tylenol, and activity modification.\par -  Follow-up: According to her symptoms.
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
## 2023-07-26 NOTE — END OF VISIT
[FreeTextEntry3] : This note was written by Kassandra Stanley on 07/26/2023 acting solely as a scribe for Dr. Tommy Eisenberg.\par  \par All medical record entries made by the Scribe were at my, Dr. Tommy Eisenberg, direction and personally dictated by me on 07/26/2023. I have personally reviewed the chart and agree that the record accurately reflects my personal performance of the history, physical exam, assessment and plan.

## 2023-07-26 NOTE — ADDENDUM
[FreeTextEntry1] : I, Kassandra Stanley, acted solely as a scribe for Dr. Eisenberg on this date on 07/26/2023.

## 2024-08-29 ENCOUNTER — NON-APPOINTMENT (OUTPATIENT)
Age: 59
End: 2024-08-29

## 2024-09-04 ENCOUNTER — APPOINTMENT (OUTPATIENT)
Dept: ORTHOPEDIC SURGERY | Facility: CLINIC | Age: 59
End: 2024-09-04
Payer: COMMERCIAL

## 2024-09-04 DIAGNOSIS — S63.91XA SPRAIN OF UNSPECIFIED PART OF RIGHT WRIST AND HAND, INITIAL ENCOUNTER: ICD-10-CM

## 2024-09-04 DIAGNOSIS — M79.646 PAIN IN UNSPECIFIED FINGER(S): ICD-10-CM

## 2024-09-04 DIAGNOSIS — S60.221A CONTUSION OF RIGHT HAND, INITIAL ENCOUNTER: ICD-10-CM

## 2024-09-04 DIAGNOSIS — M18.11 UNILATERAL PRIMARY OSTEOARTHRITIS OF FIRST CARPOMETACARPAL JOINT, RIGHT HAND: ICD-10-CM

## 2024-09-04 PROCEDURE — 29130 APPL FINGER SPLINT STATIC: CPT | Mod: F9

## 2024-09-04 PROCEDURE — 20606 DRAIN/INJ JOINT/BURSA W/US: CPT | Mod: RT

## 2024-09-04 PROCEDURE — 76881 US COMPL JOINT R-T W/IMG: CPT | Mod: RT

## 2024-09-04 PROCEDURE — 99214 OFFICE O/P EST MOD 30 MIN: CPT | Mod: 25

## 2024-09-04 RX ORDER — LIDOCAINE HYDROCHLORIDE 5 MG/ML
0.5 INJECTION, SOLUTION INFILTRATION; PERINEURAL
Refills: 0 | Status: COMPLETED | OUTPATIENT
Start: 2024-09-04

## 2024-09-04 RX ORDER — METHYLPRED ACET/NACL,ISO-OS/PF 40 MG/ML
40 VIAL (ML) INJECTION
Refills: 0 | Status: COMPLETED | OUTPATIENT
Start: 2024-09-04

## 2024-09-04 RX ADMIN — METHYLPREDNISOLONE ACETATE 1 MG/ML: 40 INJECTION, SUSPENSION INTRA-ARTICULAR; INTRALESIONAL; INTRAMUSCULAR; SOFT TISSUE at 00:00

## 2024-09-04 RX ADMIN — LIDOCAINE HYDROCHLORIDE %: 10 INJECTION, SOLUTION INFILTRATION; PERINEURAL at 00:00

## 2024-09-04 NOTE — PHYSICAL EXAM
[de-identified] : - Constitutional: This is a female in no obvious distress.   - Psych: Patient is alert and oriented to person, place and time.  Patient has a normal mood and affect. - Cardiovascular: Normal pulses throughout the upper extremities.  No significant varicosities are noted in the upper extremities.  - Neuro: Strength and sensation are intact throughout the upper extremities.  Patient has normal coordination. - Respiratory:  Patient exhibits no evidence of shortness of breath or difficulty breathing. - Skin: No rashes, lesions, or other abnormalities are noted in the upper extremities.  ---   Examination of her right hand demonstrates swelling and tenderness along the CMC joint of the thumb.  There is associated crepitus.  There is no swelling or tenderness along the distal radius dorsally or snuffbox.  There is diffuse swelling of the right little finger with associated ecchymosis.  She is tender most notably along the dorsal aspect of the PIP joint.  Her flexor and extensor tendons are intact.  She has some limitation of terminal flexion and extension.  She is neurovascularly intact distally. [de-identified] : AP, lateral, and oblique radiographs of the right little finger demonstrate no fractures or dislocations. Mild degenerative changes at the PIP and DIP joints.  PA, lateral, and oblique radiographs of the right wrist and hand a lateral radiograph of the right index finger dated 7/26/2023 demonstrated no fractures or dislocations and moderate CMC joint arthritis of the thumb.

## 2024-09-04 NOTE — PROCEDURE
[FreeTextEntry1] : - After a discussion of risks and benefits, the patient agreed to proceed with a cortisone injection.  -  Side Injected: Right thumb carpometacarpal joint. -  Medications injected: 0.5cc of 1% Lidocaine and 1cc of 40mg of Depomedrol, using sterile technique. -  Ultrasound Guidance: Ultrasound guidance was used, because of anatomical considerations and deformity, to confirm correct localization of the needle within the carpometacarpal joint, prior to the injection. -  Patient tolerated the procedure well, without complications. -  Patient noted immediate relief of the symptoms, secondary to the anesthetic effects of the injection. -  Patient was told that the pain may worsen for a day or two, and should then begin to improve. -  Instructions: The patient was instructed on the use of ice, anti-inflammatory agents, or Tylenol, and activity modification. -  Follow-up: Within 4 weeks to assess the response to the injection.

## 2024-09-04 NOTE — DISCUSSION/SUMMARY
[FreeTextEntry1] : I had a discussion regarding today's visit, the diagnosis and treatment recommendations and options.  We also discussed changes since the last visit.  She has findings consistent with a right little finger contusion after an injury 1 day ago.  There is no evidence of a fracture or dislocation.  With regard to the right little finger with was fitted with a right little finger splint to wear as needed.  She was instructed on gentle mobility exercises. She was instructed on icing and will follow up in 2 weeks if needed, according to her symptoms.   With regard to her right thumb, she opted to proceed with at the right thumb CMC joint.   The patient has agreed to the above plan of management and has expressed full understanding.  All questions were fully answered to the patient's satisfaction.  My cumulative time spent on today's visit was greater than 30 minutes and included: Preparation for the visit, review of the medical records, review of pertinent diagnostic studies, examination and counseling of the patient on the above diagnosis, treatment plan and prognosis, orders of diagnostic tests, medications and/or appropriate procedures and documentation in the medical records of today's visit.
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
## 2024-09-04 NOTE — PHYSICAL EXAM
[de-identified] : - Constitutional: This is a female in no obvious distress.   - Psych: Patient is alert and oriented to person, place and time.  Patient has a normal mood and affect. - Cardiovascular: Normal pulses throughout the upper extremities.  No significant varicosities are noted in the upper extremities.  - Neuro: Strength and sensation are intact throughout the upper extremities.  Patient has normal coordination. - Respiratory:  Patient exhibits no evidence of shortness of breath or difficulty breathing. - Skin: No rashes, lesions, or other abnormalities are noted in the upper extremities.  ---   Examination of her right hand demonstrates swelling and tenderness along the CMC joint of the thumb.  There is associated crepitus.  There is no swelling or tenderness along the distal radius dorsally or snuffbox.  There is diffuse swelling of the right little finger with associated ecchymosis.  She is tender most notably along the dorsal aspect of the PIP joint.  Her flexor and extensor tendons are intact.  She has some limitation of terminal flexion and extension.  She is neurovascularly intact distally. [de-identified] : AP, lateral, and oblique radiographs of the right little finger demonstrate no fractures or dislocations. Mild degenerative changes at the PIP and DIP joints.  PA, lateral, and oblique radiographs of the right wrist and hand a lateral radiograph of the right index finger dated 7/26/2023 demonstrated no fractures or dislocations and moderate CMC joint arthritis of the thumb.

## 2024-10-31 ENCOUNTER — APPOINTMENT (OUTPATIENT)
Dept: ORTHOPEDIC SURGERY | Facility: CLINIC | Age: 59
End: 2024-10-31
Payer: COMMERCIAL

## 2024-10-31 DIAGNOSIS — S60.221A CONTUSION OF RIGHT HAND, INITIAL ENCOUNTER: ICD-10-CM

## 2024-10-31 DIAGNOSIS — S63.91XA SPRAIN OF UNSPECIFIED PART OF RIGHT WRIST AND HAND, INITIAL ENCOUNTER: ICD-10-CM

## 2024-10-31 DIAGNOSIS — M18.11 UNILATERAL PRIMARY OSTEOARTHRITIS OF FIRST CARPOMETACARPAL JOINT, RIGHT HAND: ICD-10-CM

## 2024-10-31 PROCEDURE — 73140 X-RAY EXAM OF FINGER(S): CPT | Mod: F9

## 2024-10-31 PROCEDURE — 99214 OFFICE O/P EST MOD 30 MIN: CPT

## 2024-11-15 ENCOUNTER — OUTPATIENT (OUTPATIENT)
Dept: OUTPATIENT SERVICES | Facility: HOSPITAL | Age: 59
LOS: 1 days | End: 2024-11-15
Payer: COMMERCIAL

## 2024-11-15 ENCOUNTER — APPOINTMENT (OUTPATIENT)
Dept: MRI IMAGING | Facility: HOSPITAL | Age: 59
End: 2024-11-15

## 2024-11-15 DIAGNOSIS — S60.221A CONTUSION OF RIGHT HAND, INITIAL ENCOUNTER: ICD-10-CM

## 2024-11-15 DIAGNOSIS — Z98.84 BARIATRIC SURGERY STATUS: Chronic | ICD-10-CM

## 2024-11-15 PROCEDURE — 73218 MRI UPPER EXTREMITY W/O DYE: CPT

## 2024-11-15 PROCEDURE — 73218 MRI UPPER EXTREMITY W/O DYE: CPT | Mod: 26,RT

## 2024-12-05 ENCOUNTER — NON-APPOINTMENT (OUTPATIENT)
Age: 59
End: 2024-12-05

## 2024-12-13 ENCOUNTER — APPOINTMENT (OUTPATIENT)
Dept: ORTHOPEDIC SURGERY | Facility: CLINIC | Age: 59
End: 2024-12-13
Payer: COMMERCIAL

## 2024-12-13 DIAGNOSIS — M18.11 UNILATERAL PRIMARY OSTEOARTHRITIS OF FIRST CARPOMETACARPAL JOINT, RIGHT HAND: ICD-10-CM

## 2024-12-13 DIAGNOSIS — S60.221A CONTUSION OF RIGHT HAND, INITIAL ENCOUNTER: ICD-10-CM

## 2024-12-13 DIAGNOSIS — S63.91XA SPRAIN OF UNSPECIFIED PART OF RIGHT WRIST AND HAND, INITIAL ENCOUNTER: ICD-10-CM

## 2024-12-13 PROCEDURE — 20606 DRAIN/INJ JOINT/BURSA W/US: CPT | Mod: RT

## 2024-12-13 PROCEDURE — 99214 OFFICE O/P EST MOD 30 MIN: CPT | Mod: 25

## 2024-12-13 RX ORDER — METHYLPRED ACET/NACL,ISO-OS/PF 40 MG/ML
40 VIAL (ML) INJECTION
Refills: 0 | Status: COMPLETED | OUTPATIENT
Start: 2024-12-13

## 2024-12-13 RX ORDER — LIDOCAINE HYDROCHLORIDE 10 MG/ML
1 INJECTION, SOLUTION INFILTRATION; PERINEURAL
Refills: 0 | Status: COMPLETED | OUTPATIENT
Start: 2024-12-13

## 2024-12-13 RX ADMIN — METHYLPREDNISOLONE ACETATE MG/ML: 40 INJECTION, SUSPENSION INTRA-ARTICULAR; INTRALESIONAL; INTRAMUSCULAR; SOFT TISSUE at 00:00

## 2024-12-13 RX ADMIN — LIDOCAINE HYDROCHLORIDE %: 10 INJECTION, SOLUTION INFILTRATION; PERINEURAL at 00:00

## 2025-05-07 ENCOUNTER — NON-APPOINTMENT (OUTPATIENT)
Age: 60
End: 2025-05-07

## 2025-05-13 ENCOUNTER — NON-APPOINTMENT (OUTPATIENT)
Age: 60
End: 2025-05-13

## 2025-05-15 ENCOUNTER — APPOINTMENT (OUTPATIENT)
Dept: ORTHOPEDIC SURGERY | Facility: CLINIC | Age: 60
End: 2025-05-15

## 2025-05-15 DIAGNOSIS — M18.11 UNILATERAL PRIMARY OSTEOARTHRITIS OF FIRST CARPOMETACARPAL JOINT, RIGHT HAND: ICD-10-CM

## 2025-05-15 PROCEDURE — 20604 DRAIN/INJ JOINT/BURSA W/US: CPT | Mod: RT

## 2025-05-15 RX ORDER — METHYLPRED ACET/NACL,ISO-OS/PF 40 MG/ML
40 VIAL (ML) INJECTION
Refills: 0 | Status: COMPLETED | OUTPATIENT
Start: 2025-05-15

## 2025-05-15 RX ORDER — LIDOCAINE HCL/PF 10 MG/ML
1 VIAL (ML) INJECTION
Refills: 0 | Status: COMPLETED | OUTPATIENT
Start: 2025-05-15

## 2025-05-15 RX ADMIN — Medication 1 %: at 00:00

## 2025-05-15 RX ADMIN — METHYLPREDNISOLONE ACETATE 0.5 MG/ML: 40 INJECTION, SUSPENSION INTRA-ARTICULAR; INTRALESIONAL; INTRAMUSCULAR; SOFT TISSUE at 00:00
